# Patient Record
Sex: FEMALE | Race: WHITE | NOT HISPANIC OR LATINO | URBAN - METROPOLITAN AREA
[De-identification: names, ages, dates, MRNs, and addresses within clinical notes are randomized per-mention and may not be internally consistent; named-entity substitution may affect disease eponyms.]

---

## 2024-08-27 ENCOUNTER — OFFICE VISIT (OUTPATIENT)
Dept: URGENT CARE | Facility: CLINIC | Age: 24
End: 2024-08-27
Payer: COMMERCIAL

## 2024-08-27 VITALS
RESPIRATION RATE: 18 BRPM | DIASTOLIC BLOOD PRESSURE: 64 MMHG | WEIGHT: 175 LBS | HEART RATE: 72 BPM | TEMPERATURE: 98 F | SYSTOLIC BLOOD PRESSURE: 99 MMHG | HEIGHT: 62 IN | OXYGEN SATURATION: 99 % | BODY MASS INDEX: 32.2 KG/M2

## 2024-08-27 DIAGNOSIS — H10.9 CONJUNCTIVITIS, UNSPECIFIED CONJUNCTIVITIS TYPE, UNSPECIFIED LATERALITY: ICD-10-CM

## 2024-08-27 DIAGNOSIS — J32.9 SINUSITIS, UNSPECIFIED CHRONICITY, UNSPECIFIED LOCATION: Primary | ICD-10-CM

## 2024-08-27 RX ORDER — OFLOXACIN 3 MG/ML
2 SOLUTION/ DROPS OPHTHALMIC 4 TIMES DAILY
Qty: 10 ML | Refills: 0 | Status: SHIPPED | OUTPATIENT
Start: 2024-08-27 | End: 2024-09-03

## 2024-08-27 RX ORDER — DEXAMETHASONE SODIUM PHOSPHATE 10 MG/ML
10 INJECTION INTRAMUSCULAR; INTRAVENOUS
Status: COMPLETED | OUTPATIENT
Start: 2024-08-27 | End: 2024-08-27

## 2024-08-27 RX ORDER — AMOXICILLIN AND CLAVULANATE POTASSIUM 875; 125 MG/1; MG/1
1 TABLET, FILM COATED ORAL EVERY 12 HOURS
Qty: 14 TABLET | Refills: 0 | Status: SHIPPED | OUTPATIENT
Start: 2024-08-27 | End: 2024-09-03

## 2024-08-27 RX ADMIN — DEXAMETHASONE SODIUM PHOSPHATE 10 MG: 10 INJECTION INTRAMUSCULAR; INTRAVENOUS at 09:08

## 2024-08-27 NOTE — PATIENT INSTRUCTIONS
Plan:   Medication sent to pharmacy  Wash hands before and after using the eyedrops  If symptoms persist or worsen or you develop sharp or severe eye pain, changes in vision, or sensitivity to light, seek medical attention immediately    Start oral antibiotics today  Start taking an allergy pill daily such as claritin, zyrtec, allegrea or xyzal. Also start using a nasal steroid spray such as flonase or nasacort daily. If you are not being treated for high blood pressure, you can also take decongestant such as sudafed as needed. They can be purchased over the counterMonitor for fever. Take tylenol/acetaminophen or ibuprofen as needed. Rest and hydrate. If symptoms persist or worsen, return to clinic or seek medical attention immediately.

## 2024-08-27 NOTE — PROGRESS NOTES
"Subjective:      Patient ID: Anastasiia Hoskins is a 24 y.o. female.    Vitals:  height is 5' 2" (1.575 m) and weight is 79.4 kg (175 lb). Her tympanic temperature is 98.1 °F (36.7 °C). Her blood pressure is 99/64 and her pulse is 72. Her respiration is 18 and oxygen saturation is 99%.     Chief Complaint: Eye Problem     Patient is a 24 y.o. female who presents to urgent care with complaints of left eye redness, left eye pain, left eye drainage x1 days.  So complaining of a one-week history of postnasal drip sore throat congestion sinus pressure.  Patient is a contact lens use your.  She did take the contacts out.  Denies any sharp or severe eye ball pain, changes in vision or severe sensitivity to light.  Denies any fever shortness of breath vomiting or diarrhea.  Crustiness of the left eye in the mornings    Eye Problem   Associated symptoms include an eye discharge and eye redness.     Constitution: Negative.   HENT:  Positive for congestion and sore throat.    Cardiovascular: Negative.    Eyes:  Positive for eye discharge and eye redness.   Respiratory: Negative.     Gastrointestinal: Negative.    Genitourinary: Negative.    Musculoskeletal: Negative.    Skin: Negative.    Allergic/Immunologic: Negative.    Neurological: Negative.    Hematologic/Lymphatic: Negative.       Objective:     Physical Exam   Constitutional: She is oriented to person, place, and time. She appears well-developed. She is cooperative.  Non-toxic appearance. She does not appear ill. No distress.   HENT:   Head: Normocephalic and atraumatic.   Ears:   Right Ear: Hearing and external ear normal. no impacted cerumen (effusion right TM)  Left Ear: Hearing, tympanic membrane and external ear normal.   Mouth/Throat: Oropharynx is clear and moist and mucous membranes are normal. No oropharyngeal exudate or posterior oropharyngeal erythema (postnasal drip).   Eyes: Conjunctivae and lids are normal. Left eye exhibits discharge (erythema of the " "left conjunctiva).   Neck: Trachea normal and phonation normal. Neck supple. No edema present. No erythema present. No neck rigidity present.   Cardiovascular: Normal rate.   Pulmonary/Chest: Effort normal and breath sounds normal. No stridor. No respiratory distress. She has no decreased breath sounds. She has no wheezes. She has no rhonchi. She has no rales.   Abdominal: Normal appearance.   Neurological: She is alert and oriented to person, place, and time. She exhibits normal muscle tone. Coordination normal.   Skin: Skin is warm, dry, intact, not diaphoretic and no rash.   Psychiatric: Her speech is normal and behavior is normal. Mood, judgment and thought content normal.   Nursing note and vitals reviewed.         Previous History      Review of patient's allergies indicates:   Allergen Reactions    Bactrim [sulfamethoxazole-trimethoprim]        Past Medical History:   Diagnosis Date    Known health problems: none      Current Outpatient Medications   Medication Instructions    amoxicillin-clavulanate 875-125mg (AUGMENTIN) 875-125 mg per tablet 1 tablet, Oral, Every 12 hours    ofloxacin (OCUFLOX) 0.3 % ophthalmic solution 2 drops, Left Eye, 4 times daily     Past Surgical History:   Procedure Laterality Date    NO PAST SURGERIES       Family History   Problem Relation Name Age of Onset    Hypertension Mother      Atrial fibrillation Father      Diabetes type II Father         Social History     Tobacco Use    Smoking status: Never    Smokeless tobacco: Never   Substance Use Topics    Alcohol use: Not Currently    Drug use: Never        Physical Exam      Vital Signs Reviewed   BP 99/64   Pulse 72   Temp 98.1 °F (36.7 °C) (Tympanic)   Resp 18   Ht 5' 2" (1.575 m)   Wt 79.4 kg (175 lb)   LMP 08/12/2024 (Exact Date)   SpO2 99%   BMI 32.01 kg/m²        Procedures    Procedures     Labs     Results for orders placed or performed in visit on 09/22/22   POCT rapid strep A   Result Value Ref Range    Rapid " Group A Strep Negative        Assessment:     1. Sinusitis, unspecified chronicity, unspecified location    2. Conjunctivitis, unspecified conjunctivitis type, unspecified laterality        Plan:   Medication sent to pharmacy  Wash hands before and after using the eyedrops  If symptoms persist or worsen or you develop sharp or severe eye pain, changes in vision, or sensitivity to light, seek medical attention immediately    Start oral antibiotics today  Start taking an allergy pill daily such as claritin, zyrtec, allegrea or xyzal. Also start using a nasal steroid spray such as flonase or nasacort daily. If you are not being treated for high blood pressure, you can also take decongestant such as sudafed as needed. They can be purchased over the counterMonitor for fever. Take tylenol/acetaminophen or ibuprofen as needed. Rest and hydrate. If symptoms persist or worsen, return to clinic or seek medical attention immediately.         Sinusitis, unspecified chronicity, unspecified location    Conjunctivitis, unspecified conjunctivitis type, unspecified laterality    Other orders  -     amoxicillin-clavulanate 875-125mg (AUGMENTIN) 875-125 mg per tablet; Take 1 tablet by mouth every 12 (twelve) hours. for 7 days  Dispense: 14 tablet; Refill: 0  -     dexAMETHasone injection 10 mg  -     ofloxacin (OCUFLOX) 0.3 % ophthalmic solution; Place 2 drops into the left eye 4 (four) times daily. for 7 days  Dispense: 10 mL; Refill: 0